# Patient Record
Sex: FEMALE | Race: WHITE | HISPANIC OR LATINO | Employment: UNEMPLOYED | URBAN - METROPOLITAN AREA
[De-identification: names, ages, dates, MRNs, and addresses within clinical notes are randomized per-mention and may not be internally consistent; named-entity substitution may affect disease eponyms.]

---

## 2024-08-12 ENCOUNTER — APPOINTMENT (OUTPATIENT)
Dept: CARDIOLOGY | Facility: HOSPITAL | Age: 59
End: 2024-08-12

## 2024-08-12 ENCOUNTER — HOSPITAL ENCOUNTER (EMERGENCY)
Facility: HOSPITAL | Age: 59
Discharge: HOME | End: 2024-08-12
Attending: STUDENT IN AN ORGANIZED HEALTH CARE EDUCATION/TRAINING PROGRAM

## 2024-08-12 ENCOUNTER — APPOINTMENT (OUTPATIENT)
Dept: RADIOLOGY | Facility: HOSPITAL | Age: 59
End: 2024-08-12

## 2024-08-12 VITALS
TEMPERATURE: 97.9 F | OXYGEN SATURATION: 98 % | BODY MASS INDEX: 19.96 KG/M2 | HEART RATE: 76 BPM | SYSTOLIC BLOOD PRESSURE: 130 MMHG | WEIGHT: 112.66 LBS | DIASTOLIC BLOOD PRESSURE: 75 MMHG | HEIGHT: 63 IN | RESPIRATION RATE: 17 BRPM

## 2024-08-12 DIAGNOSIS — R07.89 CHEST WALL PAIN: Primary | ICD-10-CM

## 2024-08-12 LAB
ANION GAP SERPL CALC-SCNC: 9 MMOL/L
ATRIAL RATE: 73 BPM
BASOPHILS # BLD AUTO: 0.03 X10*3/UL (ref 0–0.1)
BASOPHILS NFR BLD AUTO: 0.6 %
BUN SERPL-MCNC: 18 MG/DL (ref 8–25)
CALCIUM SERPL-MCNC: 9.4 MG/DL (ref 8.5–10.4)
CHLORIDE SERPL-SCNC: 102 MMOL/L (ref 97–107)
CO2 SERPL-SCNC: 27 MMOL/L (ref 24–31)
CREAT SERPL-MCNC: 0.7 MG/DL (ref 0.4–1.6)
EGFRCR SERPLBLD CKD-EPI 2021: >90 ML/MIN/1.73M*2
EOSINOPHIL # BLD AUTO: 0.23 X10*3/UL (ref 0–0.7)
EOSINOPHIL NFR BLD AUTO: 4.5 %
ERYTHROCYTE [DISTWIDTH] IN BLOOD BY AUTOMATED COUNT: 11.7 % (ref 11.5–14.5)
GLUCOSE SERPL-MCNC: 101 MG/DL (ref 65–99)
HCT VFR BLD AUTO: 35.5 % (ref 36–46)
HGB BLD-MCNC: 11.9 G/DL (ref 12–16)
IMM GRANULOCYTES # BLD AUTO: 0 X10*3/UL (ref 0–0.7)
IMM GRANULOCYTES NFR BLD AUTO: 0 % (ref 0–0.9)
LYMPHOCYTES # BLD AUTO: 2.27 X10*3/UL (ref 1.2–4.8)
LYMPHOCYTES NFR BLD AUTO: 44 %
MCH RBC QN AUTO: 31.5 PG (ref 26–34)
MCHC RBC AUTO-ENTMCNC: 33.5 G/DL (ref 32–36)
MCV RBC AUTO: 94 FL (ref 80–100)
MONOCYTES # BLD AUTO: 0.37 X10*3/UL (ref 0.1–1)
MONOCYTES NFR BLD AUTO: 7.2 %
NEUTROPHILS # BLD AUTO: 2.26 X10*3/UL (ref 1.2–7.7)
NEUTROPHILS NFR BLD AUTO: 43.7 %
NRBC BLD-RTO: 0 /100 WBCS (ref 0–0)
P AXIS: 76 DEGREES
P OFFSET: 185 MS
P ONSET: 141 MS
PLATELET # BLD AUTO: 165 X10*3/UL (ref 150–450)
POTASSIUM SERPL-SCNC: 3.9 MMOL/L (ref 3.4–5.1)
PR INTERVAL: 160 MS
Q ONSET: 221 MS
QRS COUNT: 13 BEATS
QRS DURATION: 66 MS
QT INTERVAL: 410 MS
QTC CALCULATION(BAZETT): 451 MS
QTC FREDERICIA: 438 MS
R AXIS: 63 DEGREES
RBC # BLD AUTO: 3.78 X10*6/UL (ref 4–5.2)
SODIUM SERPL-SCNC: 138 MMOL/L (ref 133–145)
T AXIS: 55 DEGREES
T OFFSET: 426 MS
TROPONIN T SERPL-MCNC: <6 NG/L
VENTRICULAR RATE: 73 BPM
WBC # BLD AUTO: 5.2 X10*3/UL (ref 4.4–11.3)

## 2024-08-12 PROCEDURE — 85025 COMPLETE CBC W/AUTO DIFF WBC: CPT | Performed by: STUDENT IN AN ORGANIZED HEALTH CARE EDUCATION/TRAINING PROGRAM

## 2024-08-12 PROCEDURE — 71045 X-RAY EXAM CHEST 1 VIEW: CPT

## 2024-08-12 PROCEDURE — 84484 ASSAY OF TROPONIN QUANT: CPT | Performed by: STUDENT IN AN ORGANIZED HEALTH CARE EDUCATION/TRAINING PROGRAM

## 2024-08-12 PROCEDURE — 99283 EMERGENCY DEPT VISIT LOW MDM: CPT | Mod: 25

## 2024-08-12 PROCEDURE — 71045 X-RAY EXAM CHEST 1 VIEW: CPT | Performed by: RADIOLOGY

## 2024-08-12 PROCEDURE — 36415 COLL VENOUS BLD VENIPUNCTURE: CPT | Performed by: STUDENT IN AN ORGANIZED HEALTH CARE EDUCATION/TRAINING PROGRAM

## 2024-08-12 PROCEDURE — 93005 ELECTROCARDIOGRAM TRACING: CPT

## 2024-08-12 PROCEDURE — 80048 BASIC METABOLIC PNL TOTAL CA: CPT | Performed by: STUDENT IN AN ORGANIZED HEALTH CARE EDUCATION/TRAINING PROGRAM

## 2024-08-12 RX ORDER — ACETAMINOPHEN 325 MG/1
975 TABLET ORAL ONCE
Status: COMPLETED | OUTPATIENT
Start: 2024-08-12 | End: 2024-08-12

## 2024-08-12 ASSESSMENT — PAIN - FUNCTIONAL ASSESSMENT
PAIN_FUNCTIONAL_ASSESSMENT: 0-10
PAIN_FUNCTIONAL_ASSESSMENT: 0-10

## 2024-08-12 ASSESSMENT — PAIN SCALES - GENERAL
PAINLEVEL_OUTOF10: 0 - NO PAIN
PAINLEVEL_OUTOF10: 7
PAINLEVEL_OUTOF10: 6
PAINLEVEL_OUTOF10: 7
PAINLEVEL_OUTOF10: 0 - NO PAIN

## 2024-08-12 ASSESSMENT — COLUMBIA-SUICIDE SEVERITY RATING SCALE - C-SSRS
1. IN THE PAST MONTH, HAVE YOU WISHED YOU WERE DEAD OR WISHED YOU COULD GO TO SLEEP AND NOT WAKE UP?: NO
2. HAVE YOU ACTUALLY HAD ANY THOUGHTS OF KILLING YOURSELF?: NO
6. HAVE YOU EVER DONE ANYTHING, STARTED TO DO ANYTHING, OR PREPARED TO DO ANYTHING TO END YOUR LIFE?: NO

## 2024-08-12 ASSESSMENT — PAIN DESCRIPTION - LOCATION
LOCATION: CHEST
LOCATION: CHEST

## 2024-08-12 ASSESSMENT — PAIN DESCRIPTION - PAIN TYPE: TYPE: ACUTE PAIN

## 2024-08-12 NOTE — ED PROVIDER NOTES
HPI   Chief Complaint   Patient presents with    Pain With Breathing     Pt having pain in her right chest with breathing, as well as pain in her legs. Pt states the pain with breathing started tonight. Pt states the leg pain started about a week ago.        HPI  See [facility]      Patient History   No past medical history on file.  No past surgical history on file.  No family history on file.  Social History     Tobacco Use    Smoking status: Not on file    Smokeless tobacco: Not on file   Substance Use Topics    Alcohol use: Not on file    Drug use: Not on file       Physical Exam   ED Triage Vitals [08/12/24 0057]   Temperature Heart Rate Respirations BP   36.6 °C (97.9 °F) 77 16 130/75      Pulse Ox Temp Source Heart Rate Source Patient Position   97 % Temporal Monitor Sitting      BP Location FiO2 (%)     Right arm --       Physical Exam  See [facility]    ED Course & [facility]   ED Course as of 08/12/24 2229   Mon Aug 12, 2024   0306 EKG presented to me at 3:06 AM     EKG as interpreted by me shows normal sinus rhythm at a ventricular rate of 73 bpm, normal axis, normal intervals, no STEMI.   [DH]      ED Course User Index  [DH] Dylon Crum MD         Diagnoses as of 08/12/24 2229   Chest wall pain                 No data recorded     [place] Coma Scale Score: 15 (08/12/24 0128 : Fletcher Brennan, NI[facility])                           Medical Decision Making  59-year-old female without significant past medical history presents emergency room with right-sided chest pain that started this evening.  Patient notes it started all of a sudden and when she takes deep breaths she notes pain.  Patient also notes pain when touching her right anterior chest.  Patient has not taken any medication at home and otherwise denies recent travel, long plane rides or train rides or car rides.  Patient does note some sciatica-like pain over her right posterior leg that has been going on for quite some time now.  She otherwise denies recent fevers,  chills, nausea, vomiting, chest pain, shortness of breath, abdominal pain, diarrhea or constipation.    ED Triage Vitals [08/12/24 0057]   Temperature Heart Rate Respirations BP   36.6 °C (97.9 °F) 77 16 130/75      Pulse Ox Temp Source Heart Rate Source Patient Position   97 % Temporal Monitor Sitting      BP Location FiO2 (%)     Right arm --       Vital signs reviewed: Afebrile, nontachycardic, normotensive, 97% on room air    Exam     Constitutional: No acute distress. Resting comfortably.   Head: Normocephalic, atraumatic.   Eyes: Pupils equal bilaterally, EOM grossly intact, conjunctiva normal.  Mouth/Throat: Oropharynx is clear, moist mucus membranes.   Neck: Supple. No lymphadenopathy.  Cardiovascular: Regular rate and regular rhythm. Extremities are well-perfused.   Pulmonary/Chest: No respiratory distress, breathing comfortably on room air.  Tenderness to palpation over the right anterior chest.  Abdominal: Soft, non-tender, non-distended. No rebound or guarding.   Musculoskeletal: No lower extremity edema.       Skin: Warm, dry, and intact.  No rash  Neurological: Patient is oriented to person, place, time, and situation. Face symmetric, hearing intact to voice, speech normal. Moves all extremities.   Psych: Mood, affect, thought content, and judgment normal.    Differential includes but is not limited to:  Early herpes zoster versus musculoskeletal pain versus pneumothorax versus pneumonia versus viral illness versus ACS    Labs: ordered.  Labs Reviewed   BASIC METABOLIC PANEL - Abnormal       Result Value    Glucose 101 (*)     Sodium 138      Potassium 3.9      Chloride 102      Bicarbonate 27      Urea Nitrogen 18      Creatinine 0.70      eGFR >90      Calcium 9.4      Anion Gap 9     CBC WITH AUTO DIFFERENTIAL - Abnormal    WBC 5.2      nRBC 0.0      RBC 3.78 (*)     Hemoglobin 11.9 (*)     Hematocrit 35.5 (*)     MCV 94      MCH 31.5      MCHC 33.5      RDW 11.7      Platelets 165      Neutrophils  % 43.7      Immature Granulocytes %, Automated 0.0      Lymphocytes % 44.0      Monocytes % 7.2      Eosinophils % 4.5      Basophils % 0.6      Neutrophils Absolute 2.26      Immature Granulocytes Absolute, Automated 0.00      Lymphocytes Absolute 2.27      Monocytes Absolute 0.37      Eosinophils Absolute 0.23      Basophils Absolute 0.03     SERIAL TROPONIN, INITIAL ([facility]) - Normal    Troponin T, High Sensitivity <6     TROPONIN T SERIES, HIGH SENSITIVITY (0, 2 HR, 6 HR)    Narrative:     The following orders were created for panel order Troponin T Series, High Sensitivity (0, 2HR, 6HR).  Procedure                               Abnormality         Status                     ---------                               -----------         ------                     Serial Troponin, Initial...[[id number]]  Normal              Final result               Serial Troponin, 2 Hour ...[[id number]]                                                   Please view results for these tests on the individual orders.   SERIAL TROPONIN,  2 HOUR ([facility])       Radiology: ordered and independent interpretation performed.  Xray(s) chest x-rays interpreted by me shows no large pneumothorax at this time.    ECG/medicine tests: ordered and independent interpretation performed.  ED Course as of 08/12/24 2229   Mon Aug 12, 2024   0306 EKG presented to me at 3:06 AM     EKG as interpreted by me shows normal sinus rhythm at a ventricular rate of 73 bpm, normal axis, normal intervals, no STEMI.   [DH]      ED Course User Index  [DH] Dylon Crum MD         Diagnoses as of 08/12/24 2229   Chest wall pain     Lab work as interpreted by me shows no significant leukocytosis or anemia on CBC and otherwise BMP within normal limits.  Initial troponin is normal.  After Tylenol administration patient notes significant improvement of her pain.  Suspect musculoskeletal pain.  Chest x-ray per radiology with no acute findings at this time.  Patient otherwise with  nonischemic EKG.    PLAN AND FOLLOW-UP: Patient counseled on all findings, diagnosis and treatment plan. Patient's questions and concerns addressed. Patient stable, discharged with instructions to follow up with PMD, and to return to ED at any time for worsening symptoms or any other concerns. Patient demonstrates understanding of the findings and the importance of appropriate follow up care.    ED Medications managed:    Medications   acetaminophen (Tylenol) tablet 975 mg (975 mg oral Given 8/12/24 0223)     PATIENT REFERRED TO:  No follow-up provider specified.    DISCHARGE MEDICATIONS:  There are no discharge medications for this patient.      Dylon Crum MD  10:29 PM    Attending Emergency Physician  [facility] EMERGENCY MEDICINE            Procedure  Procedures     Dylon Crum MD  08/12/24 1803